# Patient Record
Sex: MALE | Race: WHITE | NOT HISPANIC OR LATINO | Employment: FULL TIME | URBAN - METROPOLITAN AREA
[De-identification: names, ages, dates, MRNs, and addresses within clinical notes are randomized per-mention and may not be internally consistent; named-entity substitution may affect disease eponyms.]

---

## 2018-07-24 ENCOUNTER — TRANSCRIBE ORDERS (OUTPATIENT)
Dept: ADMINISTRATIVE | Facility: HOSPITAL | Age: 53
End: 2018-07-24

## 2018-07-24 DIAGNOSIS — R80.9 PROTEINURIA, UNSPECIFIED TYPE: Primary | ICD-10-CM

## 2018-07-25 ENCOUNTER — HOSPITAL ENCOUNTER (OUTPATIENT)
Dept: RADIOLOGY | Facility: MEDICAL CENTER | Age: 53
Discharge: HOME | End: 2018-07-25
Payer: COMMERCIAL

## 2018-07-25 DIAGNOSIS — R80.9 PROTEINURIA, UNSPECIFIED TYPE: ICD-10-CM

## 2018-07-25 PROCEDURE — 76770 US EXAM ABDO BACK WALL COMP: CPT

## 2020-01-16 ENCOUNTER — HOSPITAL ENCOUNTER (EMERGENCY)
Facility: HOSPITAL | Age: 55
Discharge: HOME | End: 2020-01-16
Attending: EMERGENCY MEDICINE | Admitting: EMERGENCY MEDICINE
Payer: COMMERCIAL

## 2020-01-16 ENCOUNTER — APPOINTMENT (EMERGENCY)
Dept: RADIOLOGY | Facility: HOSPITAL | Age: 55
End: 2020-01-16
Payer: COMMERCIAL

## 2020-01-16 VITALS
RESPIRATION RATE: 16 BRPM | BODY MASS INDEX: 34.44 KG/M2 | SYSTOLIC BLOOD PRESSURE: 118 MMHG | WEIGHT: 240 LBS | DIASTOLIC BLOOD PRESSURE: 70 MMHG | OXYGEN SATURATION: 96 % | HEART RATE: 87 BPM | TEMPERATURE: 98.6 F

## 2020-01-16 DIAGNOSIS — B34.9 ACUTE VIRAL SYNDROME: Primary | ICD-10-CM

## 2020-01-16 LAB
ALBUMIN SERPL BCP-MCNC: 3.8 G/DL (ref 3.5–5)
ALP SERPL-CCNC: 58 U/L (ref 46–116)
ALT SERPL W P-5'-P-CCNC: 33 U/L (ref 12–78)
ANION GAP SERPL CALCULATED.3IONS-SCNC: 10 MMOL/L (ref 4–13)
APTT PPP: 27 SECONDS (ref 23–37)
AST SERPL W P-5'-P-CCNC: 25 U/L (ref 5–45)
BASOPHILS # BLD AUTO: 0.03 THOUSANDS/ΜL (ref 0–0.1)
BASOPHILS NFR BLD AUTO: 1 % (ref 0–1)
BILIRUB SERPL-MCNC: 0.6 MG/DL (ref 0.2–1)
BUN SERPL-MCNC: 14 MG/DL (ref 5–25)
CALCIUM SERPL-MCNC: 8.7 MG/DL (ref 8.3–10.1)
CHLORIDE SERPL-SCNC: 103 MMOL/L (ref 100–108)
CK MB SERPL-MCNC: 0.5 NG/ML (ref 0–5)
CK MB SERPL-MCNC: <1 % (ref 0–2.5)
CK SERPL-CCNC: 214 U/L (ref 39–308)
CO2 SERPL-SCNC: 28 MMOL/L (ref 21–32)
CREAT SERPL-MCNC: 1.29 MG/DL (ref 0.6–1.3)
EOSINOPHIL # BLD AUTO: 0.05 THOUSAND/ΜL (ref 0–0.61)
EOSINOPHIL NFR BLD AUTO: 1 % (ref 0–6)
ERYTHROCYTE [DISTWIDTH] IN BLOOD BY AUTOMATED COUNT: 11.8 % (ref 11.6–15.1)
FLUAV RNA NPH QL NAA+PROBE: NORMAL
FLUBV RNA NPH QL NAA+PROBE: NORMAL
GFR SERPL CREATININE-BSD FRML MDRD: 63 ML/MIN/1.73SQ M
GLUCOSE SERPL-MCNC: 97 MG/DL (ref 65–140)
HCT VFR BLD AUTO: 45.7 % (ref 36.5–49.3)
HGB BLD-MCNC: 16.1 G/DL (ref 12–17)
HOLD SPECIMEN: NORMAL
IMM GRANULOCYTES # BLD AUTO: 0.01 THOUSAND/UL (ref 0–0.2)
IMM GRANULOCYTES NFR BLD AUTO: 0 % (ref 0–2)
INR PPP: 1.07 (ref 0.84–1.19)
LACTATE SERPL-SCNC: 1.2 MMOL/L (ref 0.5–2)
LYMPHOCYTES # BLD AUTO: 0.73 THOUSANDS/ΜL (ref 0.6–4.47)
LYMPHOCYTES NFR BLD AUTO: 12 % (ref 14–44)
MCH RBC QN AUTO: 30.7 PG (ref 26.8–34.3)
MCHC RBC AUTO-ENTMCNC: 35.2 G/DL (ref 31.4–37.4)
MCV RBC AUTO: 87 FL (ref 82–98)
MONOCYTES # BLD AUTO: 1.12 THOUSAND/ΜL (ref 0.17–1.22)
MONOCYTES NFR BLD AUTO: 18 % (ref 4–12)
NEUTROPHILS # BLD AUTO: 4.43 THOUSANDS/ΜL (ref 1.85–7.62)
NEUTS SEG NFR BLD AUTO: 68 % (ref 43–75)
NRBC BLD AUTO-RTO: 0 /100 WBCS
PLATELET # BLD AUTO: 185 THOUSANDS/UL (ref 149–390)
PMV BLD AUTO: 10.9 FL (ref 8.9–12.7)
POTASSIUM SERPL-SCNC: 3.5 MMOL/L (ref 3.5–5.3)
PROCALCITONIN SERPL-MCNC: <0.05 NG/ML
PROT SERPL-MCNC: 7.6 G/DL (ref 6.4–8.2)
PROTHROMBIN TIME: 13.3 SECONDS (ref 11.6–14.5)
RBC # BLD AUTO: 5.24 MILLION/UL (ref 3.88–5.62)
RSV RNA NPH QL NAA+PROBE: NORMAL
SODIUM SERPL-SCNC: 141 MMOL/L (ref 136–145)
WBC # BLD AUTO: 6.37 THOUSAND/UL (ref 4.31–10.16)

## 2020-01-16 PROCEDURE — 85025 COMPLETE CBC W/AUTO DIFF WBC: CPT | Performed by: EMERGENCY MEDICINE

## 2020-01-16 PROCEDURE — 99284 EMERGENCY DEPT VISIT MOD MDM: CPT

## 2020-01-16 PROCEDURE — 96361 HYDRATE IV INFUSION ADD-ON: CPT

## 2020-01-16 PROCEDURE — 96374 THER/PROPH/DIAG INJ IV PUSH: CPT

## 2020-01-16 PROCEDURE — 82550 ASSAY OF CK (CPK): CPT | Performed by: EMERGENCY MEDICINE

## 2020-01-16 PROCEDURE — 82553 CREATINE MB FRACTION: CPT | Performed by: EMERGENCY MEDICINE

## 2020-01-16 PROCEDURE — 85610 PROTHROMBIN TIME: CPT | Performed by: EMERGENCY MEDICINE

## 2020-01-16 PROCEDURE — 83605 ASSAY OF LACTIC ACID: CPT | Performed by: EMERGENCY MEDICINE

## 2020-01-16 PROCEDURE — 93005 ELECTROCARDIOGRAM TRACING: CPT

## 2020-01-16 PROCEDURE — 87631 RESP VIRUS 3-5 TARGETS: CPT | Performed by: EMERGENCY MEDICINE

## 2020-01-16 PROCEDURE — 71046 X-RAY EXAM CHEST 2 VIEWS: CPT

## 2020-01-16 PROCEDURE — 36415 COLL VENOUS BLD VENIPUNCTURE: CPT

## 2020-01-16 PROCEDURE — 84145 PROCALCITONIN (PCT): CPT | Performed by: EMERGENCY MEDICINE

## 2020-01-16 PROCEDURE — 80053 COMPREHEN METABOLIC PANEL: CPT | Performed by: EMERGENCY MEDICINE

## 2020-01-16 PROCEDURE — 99284 EMERGENCY DEPT VISIT MOD MDM: CPT | Performed by: EMERGENCY MEDICINE

## 2020-01-16 PROCEDURE — 87040 BLOOD CULTURE FOR BACTERIA: CPT | Performed by: EMERGENCY MEDICINE

## 2020-01-16 PROCEDURE — 85730 THROMBOPLASTIN TIME PARTIAL: CPT | Performed by: EMERGENCY MEDICINE

## 2020-01-16 RX ORDER — KETOROLAC TROMETHAMINE 30 MG/ML
15 INJECTION, SOLUTION INTRAMUSCULAR; INTRAVENOUS ONCE
Status: COMPLETED | OUTPATIENT
Start: 2020-01-16 | End: 2020-01-16

## 2020-01-16 RX ORDER — ACETAMINOPHEN 325 MG/1
650 TABLET ORAL ONCE
Status: COMPLETED | OUTPATIENT
Start: 2020-01-16 | End: 2020-01-16

## 2020-01-16 RX ADMIN — SODIUM CHLORIDE 1000 ML: 0.9 INJECTION, SOLUTION INTRAVENOUS at 21:55

## 2020-01-16 RX ADMIN — ACETAMINOPHEN 650 MG: 325 TABLET ORAL at 21:10

## 2020-01-16 RX ADMIN — KETOROLAC TROMETHAMINE 15 MG: 30 INJECTION, SOLUTION INTRAMUSCULAR at 21:56

## 2020-01-17 LAB
ATRIAL RATE: 98 BPM
P AXIS: 52 DEGREES
PR INTERVAL: 158 MS
QRS AXIS: -43 DEGREES
QRSD INTERVAL: 112 MS
QT INTERVAL: 336 MS
QTC INTERVAL: 428 MS
T WAVE AXIS: 18 DEGREES
VENTRICULAR RATE: 98 BPM

## 2020-01-17 PROCEDURE — 93010 ELECTROCARDIOGRAM REPORT: CPT | Performed by: INTERNAL MEDICINE

## 2020-01-17 NOTE — ED NOTES
1st set of blood cultures drawn at this time  Accidentally timed 1st set for 0216  Called lab and informed them   Lab staff member stated "I will write the correct time on the bottles and send them to [facility] "      Darion Mcmahon RN  01/16/20 6856

## 2020-01-17 NOTE — ED PROVIDER NOTES
History  Chief Complaint   Patient presents with    Cough     PT c/o headache, coughing, chest hurts, body aches, fatigue, and chills  Was to [facility], tested for flu and was negative, sent to ED for further testing  PT reports "feeling like this is legionares, I had it years ago"       History provided by:  Patient   used: No    81-year-old male with history of hypertension presented for evaluation of multiple symptoms beginning earlier today  States he has a headache, mostly frontal moderate intensity with no visual changes, photophobia, neck pain  Also reports cough, chest pain with the cough, generalized body aches, fatigue, feeling feverish chills  Was seen at [facility]   Had negative flu testing and was referred to the ED for further evaluation  Denies shortness of breath, chest pain at rest   Appears uncomfortable overall with flushing of the face  Some coarse breath sounds but clearing with cough  Oropharynx still moist   Neck is supple  Plan labs, fluids, symptomatic treatment and will re-evaluate  None       Past Medical History:   Diagnosis Date    Hypertension        Past Surgical History:   Procedure Laterality Date    KNEE ARTHROSCOPY         History reviewed  No pertinent family history  I have reviewed and agree with the history as documented  Social History     Tobacco Use    Smoking status: Never Smoker    Smokeless tobacco: Never Used   Substance Use Topics    Alcohol use: Yes     Comment: rare    Drug use: Never        Review of Systems   Constitutional: Positive for chills and fever  Negative for activity change and appetite change  HENT: Negative for sinus pain  Respiratory: Positive for cough  Negative for chest tightness, shortness of breath and wheezing  Cardiovascular: Negative for chest pain  Gastrointestinal: Negative for abdominal distention, nausea and vomiting     Musculoskeletal: Negative for back pain, neck pain and neck stiffness  Skin: Negative for rash  All other systems reviewed and are negative  Physical Exam  Physical Exam   Constitutional: He is oriented to person, place, and time  He appears well-developed and well-nourished  No distress  HENT:   Head: Normocephalic  Mouth/Throat: Oropharynx is clear and moist    Neck: Normal range of motion  Neck supple  Cardiovascular: Normal rate, regular rhythm and normal heart sounds  Pulmonary/Chest: Effort normal    Scattered coarse sounds clearing with cough  Abdominal: Soft  He exhibits no distension  Musculoskeletal: Normal range of motion  He exhibits no edema or tenderness  Neurological: He is alert and oriented to person, place, and time  Skin: Skin is warm and dry  No rash noted  Psychiatric: He has a normal mood and affect  His behavior is normal    Nursing note and vitals reviewed        Vital Signs  ED Triage Vitals [01/16/20 2047]   Temperature Pulse Respirations Blood Pressure SpO2   (!) 101 6 °F (38 7 °C) (!) 110 18 129/83 98 %      Temp Source Heart Rate Source Patient Position - Orthostatic VS BP Location FiO2 (%)   Oral Monitor Lying Left arm --      Pain Score       9           Vitals:    01/16/20 2047   BP: 129/83   Pulse: (!) 110   Patient Position - Orthostatic VS: Lying         Visual Acuity      ED Medications  Medications   acetaminophen (TYLENOL) tablet 650 mg (650 mg Oral Given 1/16/20 2110)       Diagnostic Studies  Results Reviewed     Procedure Component Value Units Date/Time    CBC and differential [[id number]]  (Abnormal) Collected:  01/16/20 2106    Lab Status:  Final result Specimen:  Blood from Arm, Left Updated:  01/16/20 2124     WBC 6 37 Thousand/uL      RBC 5 24 Million/uL      Hemoglobin 16 1 g/dL      Hematocrit 45 7 %      MCV 87 fL      MCH 30 7 pg      MCHC 35 2 g/dL      RDW 11 8 %      MPV 10 9 fL      Platelets 564 Thousands/uL      nRBC 0 /100 WBCs      Neutrophils Relative 68 %      Immat GRANS % 0 % Lymphocytes Relative 12 %      Monocytes Relative 18 %      Eosinophils Relative 1 %      Basophils Relative 1 %      Neutrophils Absolute 4 43 Thousands/µL      Immature Grans Absolute 0 01 Thousand/uL      Lymphocytes Absolute 0 73 Thousands/µL      Monocytes Absolute 1 12 Thousand/µL      Eosinophils Absolute 0 05 Thousand/µL      Basophils Absolute 0 03 Thousands/µL     Lactic acid x2 [[id number]] Collected:  01/16/20 2106    Lab Status: In process Specimen:  Blood from Arm, Left Updated:  01/16/20 2120    Procalcitonin [[id number]] Collected:  01/16/20 2106    Lab Status: In process Specimen:  Blood from Arm, Left Updated:  01/16/20 2119    APTT [[id number]] Collected:  01/16/20 2106    Lab Status: In process Specimen:  Blood from Arm, Left Updated:  01/16/20 2119    Protime-INR [[id number]] Collected:  01/16/20 2106    Lab Status: In process Specimen:  Blood from Arm, Left Updated:  01/16/20 2119    Comprehensive metabolic panel [[id number]] Collected:  01/16/20 2106    Lab Status: In process Specimen:  Blood from Arm, Left Updated:  01/16/20 2119    Blood culture #2 [[id number]] Collected:  01/16/20 2115    Lab Status: In process Specimen:  Blood from Arm, Right Updated:  01/16/20 2119    Blood culture #1 [[id number]] Collected:  01/16/20 0216    Lab Status:   In process Specimen:  Blood from Arm, Left Updated:  01/16/20 2119    Lactic acid x2 [[id number]]     Lab Status:  No result Specimen:  Blood                  No orders to display              Procedures  ECG 12 Lead Documentation Only  Date/Time: 1/16/2020 9:35 PM  Performed by: Moises Arenas MD  Authorized by: Moises Arenas MD     Indications / Diagnosis:  Chest pain  ECG reviewed by me, the ED Provider: yes    Patient location:  ED  Previous ECG:     Previous ECG:  Compared to current    Comparison ECG info:  10/30/13    Similarity:  No change  Interpretation:     Interpretation: normal    Quality:     Tracing quality:  Limited by artifact  Rate:     ECG rate:  98  Rhythm:     Rhythm: sinus rhythm    QRS:     QRS axis:  Left  Conduction:     Conduction: normal    ST segments:     ST segments:  Normal  T waves:     T waves: normal               ED Course                               MDM  Number of Diagnoses or Management Options  Acute viral syndrome: new and requires workup  Diagnosis management comments: 28-year-old male with 1 day of fever, chills, body aches, headache presenting for evaluation  Lab work, flu swab unremarkable  Some symptom improvement with antipyretics, analgesics, IV fluids  Most likely a nonspecific viral syndrome  Will continue symptomatic treatment at home  Given note for work  Amount and/or Complexity of Data Reviewed  Clinical lab tests: ordered and reviewed  Obtain history from someone other than the patient: yes    Patient Progress  Patient progress: improved        Disposition  Final diagnoses:   None     ED Disposition     None      Follow-up Information    None         Patient's Medications    No medications on file     No discharge procedures on file      ED Provider  Electronically Signed by           Benito Gracia MD  01/17/20 9831

## 2020-01-17 NOTE — ED NOTES
Pt discharge instructions reviewed, Pt has no further questions at this time  Pt awake and alert, no signs of acute distress noted  Pt ambulated out of ED with a steady gait       Luther Avendano RN  01/16/20 4207

## 2020-01-22 LAB
BACTERIA BLD CULT: NORMAL
BACTERIA BLD CULT: NORMAL

## 2020-02-26 ENCOUNTER — APPOINTMENT (OUTPATIENT)
Dept: RADIOLOGY | Facility: CLINIC | Age: 55
End: 2020-02-26
Payer: COMMERCIAL

## 2020-02-26 VITALS
WEIGHT: 245.8 LBS | HEART RATE: 55 BPM | DIASTOLIC BLOOD PRESSURE: 82 MMHG | BODY MASS INDEX: 35.19 KG/M2 | HEIGHT: 70 IN | SYSTOLIC BLOOD PRESSURE: 135 MMHG

## 2020-02-26 DIAGNOSIS — M25.562 CHRONIC PAIN OF BOTH KNEES: ICD-10-CM

## 2020-02-26 DIAGNOSIS — M17.12 PRIMARY OSTEOARTHRITIS OF LEFT KNEE: ICD-10-CM

## 2020-02-26 DIAGNOSIS — G89.29 CHRONIC PAIN OF BOTH KNEES: ICD-10-CM

## 2020-02-26 DIAGNOSIS — M25.561 CHRONIC PAIN OF BOTH KNEES: ICD-10-CM

## 2020-02-26 DIAGNOSIS — M17.31 POST-TRAUMATIC OSTEOARTHRITIS OF RIGHT KNEE: Primary | ICD-10-CM

## 2020-02-26 PROCEDURE — 99204 OFFICE O/P NEW MOD 45 MIN: CPT | Performed by: ORTHOPAEDIC SURGERY

## 2020-02-26 PROCEDURE — 20610 DRAIN/INJ JOINT/BURSA W/O US: CPT | Performed by: ORTHOPAEDIC SURGERY

## 2020-02-26 PROCEDURE — 73562 X-RAY EXAM OF KNEE 3: CPT

## 2020-02-26 RX ORDER — DEXAMETHASONE SODIUM PHOSPHATE 100 MG/10ML
40 INJECTION INTRAMUSCULAR; INTRAVENOUS
Status: COMPLETED | OUTPATIENT
Start: 2020-02-26 | End: 2020-02-26

## 2020-02-26 RX ORDER — HYDROCHLOROTHIAZIDE 25 MG/1
TABLET ORAL
COMMUNITY
Start: 2020-01-21

## 2020-02-26 RX ORDER — LIDOCAINE HYDROCHLORIDE 5 MG/ML
0.5 INJECTION, SOLUTION INFILTRATION; PERINEURAL
Status: COMPLETED | OUTPATIENT
Start: 2020-02-26 | End: 2020-02-26

## 2020-02-26 RX ORDER — BENAZEPRIL HYDROCHLORIDE 40 MG/1
TABLET, FILM COATED ORAL
COMMUNITY
Start: 2020-02-10

## 2020-02-26 RX ADMIN — DEXAMETHASONE SODIUM PHOSPHATE 40 MG: 100 INJECTION INTRAMUSCULAR; INTRAVENOUS at 14:42

## 2020-02-26 RX ADMIN — LIDOCAINE HYDROCHLORIDE 0.5 ML: 5 INJECTION, SOLUTION INFILTRATION; PERINEURAL at 14:42

## 2020-02-26 NOTE — PROGRESS NOTES
Assessment/Plan:  1  Post-traumatic osteoarthritis of right knee  Ambulatory referral to Orthopedic Surgery    Large joint arthrocentesis: bilateral knee   2  Primary osteoarthritis of left knee  Ambulatory referral to Orthopedic Surgery    Large joint arthrocentesis: bilateral knee   3  Chronic pain of both knees  XR knee 3 vw right non injury    XR knee 3 vw left non injury       Scribe Attestation    I,:   Kristin Latham MA am acting as a scribe while in the presence of the attending physician :        I,:   Chun Hsu MD personally performed the services described in this documentation    as scribed in my presence :              I discussed with Evens Naima today that his signs and symptoms are consistent with bilateral knee osteoarthritis  X-rays demonstrate severe tricompartmental osteoarthritis with the right being worse than the left  He does have a varus deformity on the right  I discussed with him today that he will ultimately require knee replacements  We spoke at length today about knee replacement surgery  Treatment options were discussed in the form of bilateral knee steroid injection  He consented and underwent bilateral knee steroid injections in the office today without any complications  He is aware that he is unable to undergo knee replacement until 3 months after a steroid injection due to infection risk  He verbalized understanding of this  He is interested in undergoing surgical intervention in early June  A referral was provided to my partner Dr Hank Victor for his speciality in joint replacement  He may follow up with me as needed  Subjective:   Krishna Dewitt is a 49 y o  male who presents to the office today for evaluation of bilateral knee pain  Patient states this has been ongoing for many years  He states his left knee is worse than his right  He notes pain globally about his knees that are increased with stairs and getting up from a seated position  He denies any known injury  Patient does have a history of knee surgeries in the past  He has been taking Advil OTC without any relief  She also notes intermittent swelling  Patient has had steroid injections and viscosupplementation in the past        Review of Systems   Constitutional: Negative for chills and fever  HENT: Negative for drooling and sneezing  Eyes: Negative for redness  Respiratory: Negative for cough and wheezing  Gastrointestinal: Negative for nausea and vomiting  Musculoskeletal: Positive for arthralgias, joint swelling and myalgias  Neurological: Negative for weakness and numbness  Psychiatric/Behavioral: Negative for behavioral problems  The patient is not nervous/anxious  Past Medical History:   Diagnosis Date    Hypertension        Past Surgical History:   Procedure Laterality Date    KNEE ARTHROSCOPY         No family history on file      Social History     Occupational History    Not on file   Tobacco Use    Smoking status: Never Smoker    Smokeless tobacco: Never Used   Substance and Sexual Activity    Alcohol use: Yes     Comment: rare    Drug use: Never    Sexual activity: Not on file         Current Outpatient Medications:     aspirin 81 MG tablet, Take 81 mg by mouth daily, Disp: , Rfl:     benazepril (LOTENSIN) 40 MG tablet, TAKE 1 TABLET BY MOUTH YARELIS , Disp: , Rfl:     esomeprazole (NexIUM) 5 MG packet, Take by mouth, Disp: , Rfl:     hydrochlorothiazide (HYDRODIURIL) 25 mg tablet, , Disp: , Rfl:     Allergies   Allergen Reactions    Codeine        Objective:  Vitals:    02/26/20 1404   BP: 135/82   Pulse: 55       Right Knee Exam     Range of Motion   Right knee extension: -5    Flexion: 110     Tests   Socrates:  Medial - negative Lateral - negative  Varus: positive Valgus: negative  Drawer:  Anterior - negative    Posterior - negative    Other   Erythema: absent  Sensation: normal  Pulse: present  Effusion: effusion (1+) present    Comments:  Valgus deformity   Crepitus with ROM      Left Knee Exam     Tenderness   The patient is experiencing no tenderness  Range of Motion   Left knee extension: -3    Flexion: 110     Tests   Lachman:  Anterior - negative    Posterior - negative  Drawer:  Anterior - negative     Posterior - negative    Other   Erythema: absent  Sensation: normal  Pulse: present  Effusion: effusion (1+) present    Comments:  Crepitus with ROM          Observations   Left Knee   Positive for effusion (1+)  Right Knee   Positive for effusion (1+)  Physical Exam   Constitutional: He is oriented to person, place, and time  He appears well-developed and well-nourished  HENT:   Head: Normocephalic and atraumatic  Eyes: Conjunctivae are normal  Right eye exhibits no discharge  Left eye exhibits no discharge  Neck: Normal range of motion  Neck supple  Cardiovascular: Normal rate and intact distal pulses  Pulmonary/Chest: Effort normal  No respiratory distress  Musculoskeletal:        Right knee: He exhibits effusion (1+)  Left knee: He exhibits effusion (1+)  As noted in HPI   Neurological: He is alert and oriented to person, place, and time  Skin: Skin is warm and dry  Psychiatric: He has a normal mood and affect   His behavior is normal  Judgment and thought content normal      Large joint arthrocentesis: bilateral knee  Date/Time: 2/26/2020 2:42 PM  Consent given by: patient  Site marked: site marked  Timeout: Immediately prior to procedure a time out was called to verify the correct patient, procedure, equipment, support staff and site/side marked as required   Supporting Documentation  Indications: pain   Procedure Details  Location: knee - bilateral knee  Preparation: Patient was prepped and draped in the usual sterile fashion  Needle size: 22 G  Ultrasound guidance: no    Medications (Right): 0 5 mL lidocaine 0 5 %; 40 mg dexamethasone 100 mg/10 mLMedications (Left): 0 5 mL lidocaine 0 5 %; 40 mg dexamethasone 100 mg/10 mL Patient tolerance: patient tolerated the procedure well with no immediate complications  Dressing:  Sterile dressing applied      I have personally reviewed pertinent films in PACS and my interpretation is as follows: X-ray bilateral knee performed in the office today demonstrates severe tricompartmental osteoarthritis with the right being worse than his left

## 2020-02-28 ENCOUNTER — OFFICE VISIT (OUTPATIENT)
Dept: OBGYN CLINIC | Facility: CLINIC | Age: 55
End: 2020-02-28
Payer: COMMERCIAL

## 2020-02-28 VITALS
SYSTOLIC BLOOD PRESSURE: 122 MMHG | WEIGHT: 246 LBS | DIASTOLIC BLOOD PRESSURE: 72 MMHG | BODY MASS INDEX: 35.22 KG/M2 | HEART RATE: 49 BPM | HEIGHT: 70 IN

## 2020-02-28 DIAGNOSIS — M25.561 BILATERAL CHRONIC KNEE PAIN: ICD-10-CM

## 2020-02-28 DIAGNOSIS — G89.29 BILATERAL CHRONIC KNEE PAIN: ICD-10-CM

## 2020-02-28 DIAGNOSIS — M17.12 PRIMARY OSTEOARTHRITIS OF LEFT KNEE: Primary | ICD-10-CM

## 2020-02-28 DIAGNOSIS — M17.31 POST-TRAUMATIC OSTEOARTHRITIS OF RIGHT KNEE: ICD-10-CM

## 2020-02-28 DIAGNOSIS — M25.562 BILATERAL CHRONIC KNEE PAIN: ICD-10-CM

## 2020-02-28 PROCEDURE — 99214 OFFICE O/P EST MOD 30 MIN: CPT | Performed by: ORTHOPAEDIC SURGERY

## 2020-02-28 NOTE — PROGRESS NOTES
Assessment/Plan:  1  Primary osteoarthritis of left knee  Ambulatory referral to Orthopedic Surgery   2  Post-traumatic osteoarthritis of right knee  Ambulatory referral to Orthopedic Surgery   3  Adult BMI 35 0-35 9 kg/sq m     4  Bilateral chronic knee pain       Ed is a pleasant 78-year-old gentleman presenting today for evaluation of his activity related bilateral knee pain  After reviewing his images, history, and physical exam, he has severe end-stage underlying posttraumatic osteoarthritis of the right knee and primary osteoarthritis of the left knee  We had a long discussion with him and his wife today that he will ultimately require total knee arthroplasties  However, he is ineligible for the next 3 months since he had bilateral knee cortisone injections two days ago  We did briefly discussed possibility of simultaneous knee replacements, but his BMI of 35 in addition to comorbidity of hypertension put him at an increased risk of complication  Therefore, we will plan for staggered total knee arthroplasties  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, 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 time for knee replacement  He and his wife will discuss potential timing of surgery and can return to the office 4-5 weeks prior to their desired surgical date  He will need Mag marker x-rays of whichever knee he would like to replaced 1st   He would be a candidate for the subvastus approach on the left knee, but not the right due to his posttraumatic arthritis  They will notify our office we can be of further service  All of their questions were discussed at length today  Subjective: Bilateral knee pain    Patient ID: Lexie Hawkins is a 49 y o  male  Ed is a pleasant 78-year-old gentleman referred to our office by our colleague, Dr Sami Alexander, for subspecialty of joint replacement and reconstruction  He has had ongoing bilateral knee pain for many years including multiple surgeries    His left knee bothers him more than his right   He has pain on a daily basis  He did receive cortisone injections 2 days ago which did provide some benefit  He has been limited in his activities and 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  He reports significant clicking and snapping in the knees  He did undergo viscosupplementation in the remote past         Review of Systems   Constitutional: Negative  HENT: Negative  Eyes: Negative  Respiratory: Negative  Cardiovascular: Negative  Gastrointestinal: Negative  Endocrine: Negative  Genitourinary: Negative  Musculoskeletal: Positive for arthralgias, joint swelling and myalgias  Skin: Negative  Allergic/Immunologic: Negative  Neurological: Negative  Hematological: Negative  Psychiatric/Behavioral: Negative  Past Medical History:   Diagnosis Date    Hypertension        Past Surgical History:   Procedure Laterality Date    KNEE ARTHROSCOPY         History reviewed  No pertinent family history  Social History     Occupational History    Not on file   Tobacco Use    Smoking status: Never Smoker    Smokeless tobacco: Never Used   Substance and Sexual Activity    Alcohol use: Yes     Comment: rare    Drug use: Never    Sexual activity: Not on file         Current Outpatient Medications:     aspirin 81 MG tablet, Take 81 mg by mouth daily, Disp: , Rfl:     benazepril (LOTENSIN) 40 MG tablet, TAKE 1 TABLET BY MOUTH YARELIS , Disp: , Rfl:     esomeprazole (NexIUM) 5 MG packet, Take by mouth, Disp: , Rfl:     hydrochlorothiazide (HYDRODIURIL) 25 mg tablet, , Disp: , Rfl:     Allergies   Allergen Reactions    Codeine        Objective:  Vitals:    02/28/20 1437   BP: 122/72   Pulse: (!) 49       Body mass index is 35 3 kg/m²  Right Knee Exam     Muscle Strength   The patient has normal right knee strength  Tenderness   The patient is experiencing tenderness in the medial joint line, patella and MCL      Range of Motion   Extension: abnormal Right knee extension: -5    Flexion:  110 abnormal     Tests   Socrates:  Medial - negative Lateral - negative  Varus: positive Valgus: negative  Drawer:  Anterior - negative    Posterior - negative    Other   Erythema: absent  Scars: present  Sensation: normal  Pulse: present  Swelling: none  Effusion: effusion (1+) present    Comments:  Valgus deformity passively correctable in neutral  Crepitus with ROM  Well-healed previous incisions  Positive patellofemoral grind  Collateral ligaments stable to stressing bilaterally  Thigh and calf soft non-tender  Grossly distally neurovascularly intact      Left Knee Exam     Muscle Strength   The patient has normal left knee strength  Tenderness   The patient is experiencing tenderness in the medial joint line, patella and MCL  Range of Motion   Extension: abnormal Left knee extension: -3    Flexion:  110 abnormal     Tests   Socrates:  Medial - negative Lateral - negative  Varus: negative Valgus: negative  Drawer:  Anterior - negative     Posterior - negative    Other   Erythema: absent  Scars: present  Sensation: normal  Pulse: present  Swelling: none  Effusion: effusion (1+) present    Comments:  Crepitus with ROM  Well-healed previous incisions  Positive patellofemoral grind          Observations   Left Knee   Positive for effusion (1+)  Right Knee   Positive for effusion (1+)  Physical Exam   Constitutional: He is oriented to person, place, and time  He appears well-developed and well-nourished  Body mass index is 35 3 kg/m²  HENT:   Head: Normocephalic and atraumatic  Eyes: EOM are normal    Neck: Normal range of motion  Cardiovascular: Intact distal pulses  Pulmonary/Chest: Effort normal    Musculoskeletal:        Right knee: He exhibits effusion (1+)  Left knee: He exhibits effusion (1+)  See ortho exam   Neurological: He is alert and oriented to person, place, and time  Skin: Skin is warm and dry     Psychiatric: He has a normal mood and affect  His behavior is normal  Judgment and thought content normal    Nursing note and vitals reviewed  I have personally reviewed pertinent films in PACS of the weight bearing x-rays taken previously of his knees show severe end-stage tricompartmental degenerative changes bilaterally  There is lateral tilt to both patellae with complete lateral joint space loss  There are posttraumatic changes in the right knee the with medial subluxation of the femur on the tibia  There is a screw still present in the tibia from any so reconstruction  There is no evidence of fracture, dislocation, or lytic blastic lesion  There is tricompartmental osteophytosis and subchondral cyst formation

## 2020-12-14 ENCOUNTER — TRANSCRIBE ORDERS (OUTPATIENT)
Dept: ADMINISTRATIVE | Facility: HOSPITAL | Age: 55
End: 2020-12-14

## 2020-12-14 DIAGNOSIS — M54.12 RADICULOPATHY, CERVICAL REGION: Primary | ICD-10-CM

## 2020-12-14 DIAGNOSIS — M54.40 LUMBAGO WITH SCIATICA, UNSPECIFIED SIDE: ICD-10-CM

## 2021-03-10 DIAGNOSIS — Z23 ENCOUNTER FOR IMMUNIZATION: ICD-10-CM

## 2021-06-17 ENCOUNTER — HOSPITAL ENCOUNTER (OUTPATIENT)
Dept: RADIOLOGY | Facility: HOSPITAL | Age: 56
Discharge: HOME | End: 2021-06-17
Payer: COMMERCIAL

## 2021-06-17 DIAGNOSIS — R05.9 COUGH: ICD-10-CM

## 2021-06-17 PROCEDURE — 71046 X-RAY EXAM CHEST 2 VIEWS: CPT

## 2022-03-15 ENCOUNTER — TRANSCRIBE ORDERS (OUTPATIENT)
Dept: URGENT CARE | Facility: CLINIC | Age: 57
End: 2022-03-15

## 2022-03-15 ENCOUNTER — APPOINTMENT (OUTPATIENT)
Dept: RADIOLOGY | Facility: CLINIC | Age: 57
End: 2022-03-15
Payer: COMMERCIAL

## 2022-03-15 DIAGNOSIS — M54.50 LOW BACK PAIN, UNSPECIFIED BACK PAIN LATERALITY, UNSPECIFIED CHRONICITY, UNSPECIFIED WHETHER SCIATICA PRESENT: ICD-10-CM

## 2022-03-15 DIAGNOSIS — M54.50 LOW BACK PAIN, UNSPECIFIED BACK PAIN LATERALITY, UNSPECIFIED CHRONICITY, UNSPECIFIED WHETHER SCIATICA PRESENT: Primary | ICD-10-CM

## 2022-03-15 PROCEDURE — 72110 X-RAY EXAM L-2 SPINE 4/>VWS: CPT

## 2022-03-21 ENCOUNTER — HOSPITAL ENCOUNTER (OUTPATIENT)
Dept: RADIOLOGY | Facility: HOSPITAL | Age: 57
Discharge: HOME | End: 2022-03-21
Payer: COMMERCIAL

## 2022-03-21 DIAGNOSIS — R10.84 GENERALIZED ABDOMINAL PAIN: ICD-10-CM

## 2022-03-21 PROCEDURE — G1004 CDSM NDSC: HCPCS

## 2022-03-21 PROCEDURE — 74177 CT ABD & PELVIS W/CONTRAST: CPT

## 2022-03-21 RX ADMIN — IOHEXOL 100 ML: 350 INJECTION, SOLUTION INTRAVENOUS at 16:13

## 2022-04-21 ENCOUNTER — HOSPITAL ENCOUNTER (OUTPATIENT)
Dept: RADIOLOGY | Facility: HOSPITAL | Age: 57
Discharge: HOME | End: 2022-04-21
Payer: COMMERCIAL

## 2022-04-21 ENCOUNTER — APPOINTMENT (OUTPATIENT)
Dept: LAB | Facility: HOSPITAL | Age: 57
End: 2022-04-21
Payer: COMMERCIAL

## 2022-04-21 DIAGNOSIS — R52 PAIN, GENERALIZED: ICD-10-CM

## 2022-04-21 DIAGNOSIS — M25.50 PAIN IN JOINT, MULTIPLE SITES: ICD-10-CM

## 2022-04-21 DIAGNOSIS — M15.0 PRIMARY GENERALIZED HYPERTROPHIC OSTEOARTHROSIS: ICD-10-CM

## 2022-04-21 DIAGNOSIS — M54.40 LOW BACK PAIN WITH SCIATICA, SCIATICA LATERALITY UNSPECIFIED, UNSPECIFIED BACK PAIN LATERALITY, UNSPECIFIED CHRONICITY: ICD-10-CM

## 2022-04-21 LAB — CRP SERPL QL: 0.5 MG/L

## 2022-04-21 PROCEDURE — 86256 FLUORESCENT ANTIBODY TITER: CPT

## 2022-04-21 PROCEDURE — 86618 LYME DISEASE ANTIBODY: CPT

## 2022-04-21 PROCEDURE — 83516 IMMUNOASSAY NONANTIBODY: CPT

## 2022-04-21 PROCEDURE — 86430 RHEUMATOID FACTOR TEST QUAL: CPT

## 2022-04-21 PROCEDURE — 73521 X-RAY EXAM HIPS BI 2 VIEWS: CPT

## 2022-04-21 PROCEDURE — 72110 X-RAY EXAM L-2 SPINE 4/>VWS: CPT

## 2022-04-21 PROCEDURE — 86038 ANTINUCLEAR ANTIBODIES: CPT

## 2022-04-21 PROCEDURE — 86235 NUCLEAR ANTIGEN ANTIBODY: CPT

## 2022-04-21 PROCEDURE — 36415 COLL VENOUS BLD VENIPUNCTURE: CPT

## 2022-04-21 PROCEDURE — 86140 C-REACTIVE PROTEIN: CPT

## 2022-04-22 LAB
B BURGDOR IGG+IGM SER-ACNC: 11
RHEUMATOID FACT SER QL LA: NEGATIVE

## 2022-04-23 LAB
ENA SS-A AB SER-ACNC: <0.2 AI (ref 0–0.9)
ENA SS-B AB SER-ACNC: <0.2 AI (ref 0–0.9)

## 2022-05-05 LAB — MISCELLANEOUS LAB TEST RESULT: NORMAL

## 2025-06-06 ENCOUNTER — TELEPHONE (OUTPATIENT)
Dept: GASTROENTEROLOGY | Facility: CLINIC | Age: 60
End: 2025-06-06

## 2025-06-06 NOTE — TELEPHONE ENCOUNTER
New pt referred to GI for a colonoscopy. Pt had rectal bleeding a week ago; currently asymptomatic. PEP scheduled an OV 06/26 with . Advised PEP this is fine. Urgent visit is not required. Should pt become symptomatic advise him to return call for triage. PEP will relay message to pt.

## 2025-06-26 ENCOUNTER — OFFICE VISIT (OUTPATIENT)
Dept: GASTROENTEROLOGY | Facility: CLINIC | Age: 60
End: 2025-06-26
Payer: COMMERCIAL

## 2025-06-26 ENCOUNTER — TELEPHONE (OUTPATIENT)
Dept: GASTROENTEROLOGY | Facility: CLINIC | Age: 60
End: 2025-06-26

## 2025-06-26 VITALS
WEIGHT: 250 LBS | HEART RATE: 65 BPM | HEIGHT: 70 IN | BODY MASS INDEX: 35.79 KG/M2 | SYSTOLIC BLOOD PRESSURE: 125 MMHG | DIASTOLIC BLOOD PRESSURE: 83 MMHG

## 2025-06-26 DIAGNOSIS — K21.9 GASTROESOPHAGEAL REFLUX DISEASE WITHOUT ESOPHAGITIS: ICD-10-CM

## 2025-06-26 DIAGNOSIS — K92.1 BLOOD IN STOOL: Primary | ICD-10-CM

## 2025-06-26 DIAGNOSIS — K63.9 COLON WALL THICKENING: ICD-10-CM

## 2025-06-26 DIAGNOSIS — K76.0 FATTY LIVER: ICD-10-CM

## 2025-06-26 PROCEDURE — 99204 OFFICE O/P NEW MOD 45 MIN: CPT | Performed by: INTERNAL MEDICINE

## 2025-06-26 RX ORDER — VALACYCLOVIR HYDROCHLORIDE 1 G/1
1 TABLET, FILM COATED ORAL 2 TIMES DAILY
COMMUNITY
Start: 2025-04-06

## 2025-06-26 RX ORDER — TADALAFIL 5 MG/1
1 TABLET ORAL DAILY
COMMUNITY
Start: 2025-04-07

## 2025-06-26 RX ORDER — POLYETHYLENE GLYCOL 3350, SODIUM SULFATE ANHYDROUS, SODIUM BICARBONATE, SODIUM CHLORIDE, POTASSIUM CHLORIDE 236; 22.74; 6.74; 5.86; 2.97 G/4L; G/4L; G/4L; G/4L; G/4L
4 POWDER, FOR SOLUTION ORAL ONCE
Qty: 4000 ML | Refills: 0 | Status: SHIPPED | OUTPATIENT
Start: 2025-06-26 | End: 2025-06-26

## 2025-06-26 RX ORDER — SODIUM CHLORIDE, SODIUM LACTATE, POTASSIUM CHLORIDE, CALCIUM CHLORIDE 600; 310; 30; 20 MG/100ML; MG/100ML; MG/100ML; MG/100ML
125 INJECTION, SOLUTION INTRAVENOUS CONTINUOUS
OUTPATIENT
Start: 2025-06-26

## 2025-06-26 RX ORDER — FAMOTIDINE 40 MG/1
1 TABLET, FILM COATED ORAL 2 TIMES DAILY
COMMUNITY
Start: 2025-04-07

## 2025-06-26 RX ORDER — NEBIVOLOL HYDROCHLORIDE 5 MG/1
TABLET ORAL
COMMUNITY

## 2025-06-26 NOTE — TELEPHONE ENCOUNTER
Scheduled date of EGD/colonoscopy (as of today): 7/24/25  Physician performing EGD/colonoscopy: Dr Espino  Location of EGD/colonoscopy: [facility]  Desired bowel prep reviewed with patient: Golytely given at appt   Instructions reviewed with patient by: beth  Clearances:  n/a

## 2025-06-26 NOTE — PROGRESS NOTES
Name: Nik Kurtz      : 1965      MRN: [id number]  Encounter Provider: Robert Espino MD  Encounter Date: 2025   Encounter department: [facility] [place] [address]  :  Assessment & Plan  Blood in stool  History of blood in stools about a month ago, none since, and hemorrhoids and or other colonic pathology of concern.  Last colonoscopy about 9 years ago was generally unrevealing.  Some thickening of the colon on the CT scan may be colitis and are unrelated/artifactual.  Since last colonoscopy 9 years ago plan a colonoscopy, procedure and prep discussed and the patient and his wife.  Orders:  •  Colonoscopy; Future  •  EGD; Future  •  polyethylene glycol (Golytely) 4000 mL solution; Take 4,000 mL by mouth once for 1 dose Take according to instructions given by the office for colonoscopy bowel prep.    Fatty liver  Fatty liver on CT imaging, high BMI, has gained weight.  Also thinks he may have high cholesterol.  Advised him to follow-up with his medical doctor for the same.  In the meanwhile encouraged to lose weight.  Consequences of fatty liver including cirrhosis scarring discussed with patient and his wife.       Colon wall thickening  COLON 2016  Orders:  •  Colonoscopy; Future    BMI 35.0-35.9,adult         Gastroesophageal reflux disease without esophagitis  History of chronic GERD, antireflux measure reviewed, above measures may help with his reflux as well.  May take acid reducer for symptomatic relief.  Schedule EGD for longstanding reflux, check for Renteria's, only modest response to acid reducers.       FATTY LIVER   TRANSV C THIENING    History of Present Illness   Nik Kurtz is a 60 y.o. male who presents with multiple medical issues, he noted blood in stools lasted a week about a month ago, dark red to bright red in color with some clots.  Denies any painful defecation.  Does have past history of hemorrhoids but no travel new medications and/or  "any blood thinners.  Has had generalized aches and pains CT scan of the abdomen done revealed some thickening in the transverse colon and also fatty liver.  He has heartburn acid reflux indigestion used to take Nexium for many years, now on famotidine with partial relief.  Does consume fair amount of coffee soda fried foods late-night meals and also has gained weight.  Denies any chest pain shortness of breath fever chills rash,  in respect.  Diet medication more than 10 systems reviewed.  Does not drink much alcohol.  HPI    Review of Systems   Constitutional:  Negative for chills and fever.   HENT:  Negative for ear pain and sore throat.    Eyes:  Negative for pain and visual disturbance.   Respiratory:  Negative for cough and shortness of breath.    Cardiovascular:  Negative for chest pain and palpitations.   Gastrointestinal:  Negative for abdominal pain and vomiting.   Genitourinary:  Negative for dysuria and hematuria.   Musculoskeletal:  Negative for arthralgias and back pain.   Skin:  Negative for color change and rash.   Neurological:  Negative for seizures and syncope.   All other systems reviewed and are negative.   A complete review of systems is negative other than that noted above in the HPI.         Objective   /83 (Patient Position: Sitting, Cuff Size: Standard)   Pulse 65   Ht 5' 10\" (1.778 m)   Wt 113 kg (250 lb)   BMI 35.87 kg/m²     Physical Exam  Vitals and nursing note reviewed.   Constitutional:       General: He is not in acute distress.     Appearance: Normal appearance. He is well-developed and normal weight. He is not ill-appearing.   HENT:      Head: Normocephalic and atraumatic.      Mouth/Throat:      Mouth: Mucous membranes are moist.     Eyes:      Conjunctiva/sclera: Conjunctivae normal.       Cardiovascular:      Rate and Rhythm: Normal rate and regular rhythm.      Heart sounds: No murmur heard.  Pulmonary:      Effort: Pulmonary effort is normal. No " respiratory distress.      Breath sounds: Normal breath sounds. No wheezing.   Abdominal:      General: Bowel sounds are normal. There is no distension.      Palpations: Abdomen is soft. There is no mass.      Tenderness: There is no abdominal tenderness. There is no guarding or rebound.      Hernia: No hernia is present.     Musculoskeletal:         General: No swelling.      Cervical back: Neck supple.     Skin:     General: Skin is warm.      Capillary Refill: Capillary refill takes less than 2 seconds.      Coloration: Skin is not jaundiced or pale.     Neurological:      Mental Status: He is alert and oriented to person, place, and time.      Gait: Gait normal.     Psychiatric:         Mood and Affect: Mood normal.            Lab Results: I personally reviewed relevant lab results.

## 2025-06-26 NOTE — H&P (VIEW-ONLY)
Name: Nik Kurtz      : 1965      MRN: [id number]  Encounter Provider: Robert Espino MD  Encounter Date: 2025   Encounter department: [facility] [place] [address]  :  Assessment & Plan  Blood in stool  History of blood in stools about a month ago, none since, and hemorrhoids and or other colonic pathology of concern.  Last colonoscopy about 9 years ago was generally unrevealing.  Some thickening of the colon on the CT scan may be colitis and are unrelated/artifactual.  Since last colonoscopy 9 years ago plan a colonoscopy, procedure and prep discussed and the patient and his wife.  Orders:  •  Colonoscopy; Future  •  EGD; Future  •  polyethylene glycol (Golytely) 4000 mL solution; Take 4,000 mL by mouth once for 1 dose Take according to instructions given by the office for colonoscopy bowel prep.    Fatty liver  Fatty liver on CT imaging, high BMI, has gained weight.  Also thinks he may have high cholesterol.  Advised him to follow-up with his medical doctor for the same.  In the meanwhile encouraged to lose weight.  Consequences of fatty liver including cirrhosis scarring discussed with patient and his wife.       Colon wall thickening  COLON 2016  Orders:  •  Colonoscopy; Future    BMI 35.0-35.9,adult         Gastroesophageal reflux disease without esophagitis  History of chronic GERD, antireflux measure reviewed, above measures may help with his reflux as well.  May take acid reducer for symptomatic relief.  Schedule EGD for longstanding reflux, check for Renteria's, only modest response to acid reducers.       FATTY LIVER   TRANSV C THIENING    History of Present Illness   Nik Kurtz is a 60 y.o. male who presents with multiple medical issues, he noted blood in stools lasted a week about a month ago, dark red to bright red in color with some clots.  Denies any painful defecation.  Does have past history of hemorrhoids but no travel new medications and/or  "any blood thinners.  Has had generalized aches and pains CT scan of the abdomen done revealed some thickening in the transverse colon and also fatty liver.  He has heartburn acid reflux indigestion used to take Nexium for many years, now on famotidine with partial relief.  Does consume fair amount of coffee soda fried foods late-night meals and also has gained weight.  Denies any chest pain shortness of breath fever chills rash,  in respect.  Diet medication more than 10 systems reviewed.  Does not drink much alcohol.  HPI    Review of Systems   Constitutional:  Negative for chills and fever.   HENT:  Negative for ear pain and sore throat.    Eyes:  Negative for pain and visual disturbance.   Respiratory:  Negative for cough and shortness of breath.    Cardiovascular:  Negative for chest pain and palpitations.   Gastrointestinal:  Negative for abdominal pain and vomiting.   Genitourinary:  Negative for dysuria and hematuria.   Musculoskeletal:  Negative for arthralgias and back pain.   Skin:  Negative for color change and rash.   Neurological:  Negative for seizures and syncope.   All other systems reviewed and are negative.   A complete review of systems is negative other than that noted above in the HPI.         Objective   /83 (Patient Position: Sitting, Cuff Size: Standard)   Pulse 65   Ht 5' 10\" (1.778 m)   Wt 113 kg (250 lb)   BMI 35.87 kg/m²     Physical Exam  Vitals and nursing note reviewed.   Constitutional:       General: He is not in acute distress.     Appearance: Normal appearance. He is well-developed and normal weight. He is not ill-appearing.   HENT:      Head: Normocephalic and atraumatic.      Mouth/Throat:      Mouth: Mucous membranes are moist.     Eyes:      Conjunctiva/sclera: Conjunctivae normal.       Cardiovascular:      Rate and Rhythm: Normal rate and regular rhythm.      Heart sounds: No murmur heard.  Pulmonary:      Effort: Pulmonary effort is normal. No " respiratory distress.      Breath sounds: Normal breath sounds. No wheezing.   Abdominal:      General: Bowel sounds are normal. There is no distension.      Palpations: Abdomen is soft. There is no mass.      Tenderness: There is no abdominal tenderness. There is no guarding or rebound.      Hernia: No hernia is present.     Musculoskeletal:         General: No swelling.      Cervical back: Neck supple.     Skin:     General: Skin is warm.      Capillary Refill: Capillary refill takes less than 2 seconds.      Coloration: Skin is not jaundiced or pale.     Neurological:      Mental Status: He is alert and oriented to person, place, and time.      Gait: Gait normal.     Psychiatric:         Mood and Affect: Mood normal.            Lab Results: I personally reviewed relevant lab results.

## 2025-07-10 ENCOUNTER — ANESTHESIA (OUTPATIENT)
Dept: ANESTHESIOLOGY | Facility: HOSPITAL | Age: 60
End: 2025-07-10

## 2025-07-10 ENCOUNTER — ANESTHESIA EVENT (OUTPATIENT)
Dept: ANESTHESIOLOGY | Facility: HOSPITAL | Age: 60
End: 2025-07-10

## 2025-07-23 RX ORDER — SODIUM CHLORIDE, SODIUM LACTATE, POTASSIUM CHLORIDE, CALCIUM CHLORIDE 600; 310; 30; 20 MG/100ML; MG/100ML; MG/100ML; MG/100ML
75 INJECTION, SOLUTION INTRAVENOUS CONTINUOUS
Status: CANCELLED | OUTPATIENT
Start: 2025-07-23

## 2025-07-24 ENCOUNTER — HOSPITAL ENCOUNTER (OUTPATIENT)
Dept: GASTROENTEROLOGY | Facility: AMBULARY SURGERY CENTER | Age: 60
Setting detail: OUTPATIENT SURGERY
End: 2025-07-24
Attending: INTERNAL MEDICINE
Payer: COMMERCIAL

## 2025-07-24 ENCOUNTER — ANESTHESIA EVENT (OUTPATIENT)
Dept: GASTROENTEROLOGY | Facility: AMBULARY SURGERY CENTER | Age: 60
End: 2025-07-24
Payer: COMMERCIAL

## 2025-07-24 VITALS
DIASTOLIC BLOOD PRESSURE: 77 MMHG | OXYGEN SATURATION: 95 % | HEART RATE: 59 BPM | BODY MASS INDEX: 35.79 KG/M2 | HEIGHT: 70 IN | RESPIRATION RATE: 18 BRPM | WEIGHT: 250 LBS | TEMPERATURE: 97.9 F | SYSTOLIC BLOOD PRESSURE: 116 MMHG

## 2025-07-24 DIAGNOSIS — K63.9 COLON WALL THICKENING: ICD-10-CM

## 2025-07-24 DIAGNOSIS — K21.00 GASTROESOPHAGEAL REFLUX DISEASE WITH ESOPHAGITIS WITHOUT HEMORRHAGE: Primary | ICD-10-CM

## 2025-07-24 DIAGNOSIS — K92.1 BLOOD IN STOOL: ICD-10-CM

## 2025-07-24 PROBLEM — K21.9 GASTROESOPHAGEAL REFLUX DISEASE: Status: ACTIVE | Noted: 2025-07-24

## 2025-07-24 PROBLEM — Z96.653 S/P TOTAL KNEE ARTHROPLASTY, BILATERAL: Status: ACTIVE | Noted: 2025-07-24

## 2025-07-24 PROCEDURE — 88313 SPECIAL STAINS GROUP 2: CPT | Performed by: PATHOLOGY

## 2025-07-24 PROCEDURE — 88305 TISSUE EXAM BY PATHOLOGIST: CPT | Performed by: PATHOLOGY

## 2025-07-24 PROCEDURE — 88342 IMHCHEM/IMCYTCHM 1ST ANTB: CPT | Performed by: PATHOLOGY

## 2025-07-24 PROCEDURE — 88341 IMHCHEM/IMCYTCHM EA ADD ANTB: CPT | Performed by: PATHOLOGY

## 2025-07-24 RX ORDER — PANTOPRAZOLE SODIUM 40 MG/1
40 TABLET, DELAYED RELEASE ORAL DAILY
Qty: 30 TABLET | Refills: 6 | Status: SHIPPED | OUTPATIENT
Start: 2025-07-24

## 2025-07-24 RX ORDER — SODIUM CHLORIDE, SODIUM LACTATE, POTASSIUM CHLORIDE, CALCIUM CHLORIDE 600; 310; 30; 20 MG/100ML; MG/100ML; MG/100ML; MG/100ML
75 INJECTION, SOLUTION INTRAVENOUS CONTINUOUS
Status: DISPENSED | OUTPATIENT
Start: 2025-07-24

## 2025-07-24 RX ORDER — SODIUM CHLORIDE, SODIUM LACTATE, POTASSIUM CHLORIDE, CALCIUM CHLORIDE 600; 310; 30; 20 MG/100ML; MG/100ML; MG/100ML; MG/100ML
125 INJECTION, SOLUTION INTRAVENOUS CONTINUOUS
Status: DISPENSED | OUTPATIENT
Start: 2025-07-24

## 2025-07-24 RX ORDER — PROPOFOL 10 MG/ML
INJECTION, EMULSION INTRAVENOUS CONTINUOUS PRN
Status: DISCONTINUED | OUTPATIENT
Start: 2025-07-24 | End: 2025-07-24

## 2025-07-24 RX ORDER — PROPOFOL 10 MG/ML
INJECTION, EMULSION INTRAVENOUS AS NEEDED
Status: DISCONTINUED | OUTPATIENT
Start: 2025-07-24 | End: 2025-07-24

## 2025-07-24 RX ORDER — LIDOCAINE HYDROCHLORIDE 20 MG/ML
INJECTION, SOLUTION EPIDURAL; INFILTRATION; INTRACAUDAL; PERINEURAL AS NEEDED
Status: DISCONTINUED | OUTPATIENT
Start: 2025-07-24 | End: 2025-07-24

## 2025-07-24 RX ADMIN — PROPOFOL 120 MCG/KG/MIN: 10 INJECTION, EMULSION INTRAVENOUS at 09:11

## 2025-07-24 RX ADMIN — LIDOCAINE HYDROCHLORIDE 100 MG: 20 INJECTION, SOLUTION EPIDURAL; INFILTRATION; INTRACAUDAL; PERINEURAL at 09:02

## 2025-07-24 RX ADMIN — PROPOFOL 50 MG: 10 INJECTION, EMULSION INTRAVENOUS at 09:06

## 2025-07-24 RX ADMIN — PROPOFOL 200 MG: 10 INJECTION, EMULSION INTRAVENOUS at 09:02

## 2025-07-24 RX ADMIN — SODIUM CHLORIDE, SODIUM LACTATE, POTASSIUM CHLORIDE, AND CALCIUM CHLORIDE: .6; .31; .03; .02 INJECTION, SOLUTION INTRAVENOUS at 08:45

## 2025-07-24 NOTE — ANESTHESIA POSTPROCEDURE EVALUATION
Post-Op Assessment Note    CV Status:  Stable  Pain Score: 0    Pain management: adequate       Mental Status:  Sleepy   Hydration Status:  Stable   PONV Controlled:  None   Airway Patency:  Patent and adequate  Two or more mitigation strategies used for obstructive sleep apnea   Post Op Vitals Reviewed: Yes    No anethesia notable event occurred.    Staff: CRNA           Last Filed PACU Vitals:  Vitals Value Taken Time   Temp     Pulse     BP     Resp     SpO2

## 2025-07-24 NOTE — ANESTHESIA PREPROCEDURE EVALUATION
Procedure:  COLONOSCOPY  EGD    Relevant Problems   CARDIO   (+) Hypertension      GI/HEPATIC   (+) Gastroesophageal reflux disease      MUSCULOSKELETAL   (+) Post-traumatic osteoarthritis of right knee   (+) Primary osteoarthritis of left knee      Orthopedic/Musculoskeletal   (+) S/P total knee arthroplasty, bilateral        Physical Exam    Airway     Mallampati score: III  TM Distance: >3 FB  Neck ROM: full  Mouth opening: >= 4 cm      Cardiovascular  Rhythm: regular, Rate: normal    Dental   No notable dental hx     Pulmonary   Breath sounds clear to auscultation    Neurological    He appears awake, alert and oriented x3.      Other Findings        Anesthesia Plan  ASA Score- 2     Anesthesia Type- IV sedation with anesthesia with ASA Monitors.         Additional Monitors:     Airway Plan:            Plan Factors-    Chart reviewed.        Patient is not a current smoker.              Induction- intravenous.    Postoperative Plan- .   Monitoring Plan - Monitoring plan - standard ASA monitoring      Perioperative Resuscitation Plan - Level 1 - Full Code.       Informed Consent- Anesthetic plan and risks discussed with patient.  I personally reviewed this patient with the CRNA. Discussed and agreed on the Anesthesia Plan with the CRNA..      NPO Status:  Vitals Value Taken Time   Date of last liquid 07/24/25 07/24/25 08:19   Time of last liquid 0230 07/24/25 08:19   Date of last solid 07/22/25 07/24/25 08:19   Time of last solid 1600 07/24/25 08:19

## 2025-07-24 NOTE — INTERVAL H&P NOTE
H&P reviewed. After examining the patient I find no changes in the patients condition since the H&P had been written.    Vitals:    07/24/25 0828   BP: 129/88   Resp: 18

## 2025-07-29 PROCEDURE — 88313 SPECIAL STAINS GROUP 2: CPT | Performed by: PATHOLOGY

## 2025-07-29 PROCEDURE — 88341 IMHCHEM/IMCYTCHM EA ADD ANTB: CPT | Performed by: PATHOLOGY

## 2025-07-29 PROCEDURE — 88342 IMHCHEM/IMCYTCHM 1ST ANTB: CPT | Performed by: PATHOLOGY

## 2025-07-29 PROCEDURE — 88305 TISSUE EXAM BY PATHOLOGIST: CPT | Performed by: PATHOLOGY
